# Patient Record
Sex: FEMALE | Race: WHITE
[De-identification: names, ages, dates, MRNs, and addresses within clinical notes are randomized per-mention and may not be internally consistent; named-entity substitution may affect disease eponyms.]

---

## 2022-10-18 ENCOUNTER — HOSPITAL ENCOUNTER (EMERGENCY)
Dept: HOSPITAL 54 - ER | Age: 32
Discharge: HOME | End: 2022-10-18
Payer: COMMERCIAL

## 2022-10-18 VITALS — SYSTOLIC BLOOD PRESSURE: 130 MMHG | DIASTOLIC BLOOD PRESSURE: 81 MMHG

## 2022-10-18 VITALS — HEIGHT: 64 IN | WEIGHT: 145 LBS | BODY MASS INDEX: 24.75 KG/M2
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
## 2022-10-18 DIAGNOSIS — Z79.1: ICD-10-CM

## 2022-10-18 DIAGNOSIS — S33.8XXA: Primary | ICD-10-CM
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
## 2022-10-18 DIAGNOSIS — Z60.2: ICD-10-CM
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
## 2022-10-18 SDOH — SOCIAL STABILITY - SOCIAL INSECURITY: PROBLEMS RELATED TO LIVING ALONE: Z60.2

## 2022-10-18 NOTE — NUR
CAME IN FOR SACRAL PAIN, FELL BACKWARDS LANDING ON HER TAILBONE 2 DAYS AGO 
WHILE HELPING HER PARTNER. TO ER BED 10, HOOKED TO MONITOR, CHANGED TO HOSP 
GOWN, WARM BLANKET PROVIDED. AWAITING MD FLORES

## 2022-10-18 NOTE — NUR
PT IS RESTING COMFORTABLY IN BED. ALERT AND ORIENTED , RR EVEN AND NONLABORED. 
DENIES PAIN AT THIS TIME. CONNECTED TO MONITOR. VSS. WILL CONTINUE TO MONITOR.